# Patient Record
Sex: MALE | Race: OTHER | Employment: OTHER | URBAN - METROPOLITAN AREA
[De-identification: names, ages, dates, MRNs, and addresses within clinical notes are randomized per-mention and may not be internally consistent; named-entity substitution may affect disease eponyms.]

---

## 2017-01-01 ENCOUNTER — HOME CARE VISIT (OUTPATIENT)
Dept: HOSPICE | Facility: HOSPICE | Age: 82
End: 2017-01-01
Payer: MEDICARE

## 2017-01-01 ENCOUNTER — HOME CARE VISIT (OUTPATIENT)
Dept: SCHEDULING | Facility: HOME HEALTH | Age: 82
End: 2017-01-01
Payer: MEDICARE

## 2017-01-01 ENCOUNTER — HOSPITAL ENCOUNTER (INPATIENT)
Age: 82
LOS: 1 days | End: 2017-01-04
Attending: INTERNAL MEDICINE | Admitting: INTERNAL MEDICINE

## 2017-01-01 VITALS
OXYGEN SATURATION: 90 % | RESPIRATION RATE: 20 BRPM | SYSTOLIC BLOOD PRESSURE: 50 MMHG | DIASTOLIC BLOOD PRESSURE: 42 MMHG | TEMPERATURE: 91.1 F

## 2017-01-01 PROCEDURE — 74011250637 HC RX REV CODE- 250/637: Performed by: INTERNAL MEDICINE

## 2017-01-01 PROCEDURE — 0655 HSPC INPATIENT RESPITE

## 2017-01-01 PROCEDURE — 0651 HSPC ROUTINE HOME CARE

## 2017-01-01 PROCEDURE — G0156 HHCP-SVS OF AIDE,EA 15 MIN: HCPCS

## 2017-01-01 RX ORDER — MORPHINE SULFATE 20 MG/ML
2.5 SOLUTION ORAL
Status: DISCONTINUED | OUTPATIENT
Start: 2017-01-01 | End: 2017-01-01 | Stop reason: HOSPADM

## 2017-01-01 RX ORDER — WARFARIN 2 MG/1
2 TABLET ORAL DAILY
Status: DISCONTINUED | OUTPATIENT
Start: 2017-01-01 | End: 2017-01-01 | Stop reason: HOSPADM

## 2017-01-01 RX ORDER — LORAZEPAM 2 MG/ML
0.5 CONCENTRATE ORAL
Status: DISCONTINUED | OUTPATIENT
Start: 2017-01-01 | End: 2017-01-01 | Stop reason: HOSPADM

## 2017-01-01 RX ORDER — HYDROCORTISONE 25 MG/G
CREAM TOPICAL
Status: DISCONTINUED | OUTPATIENT
Start: 2017-01-01 | End: 2017-01-01 | Stop reason: HOSPADM

## 2017-01-01 RX ORDER — ATENOLOL 50 MG/1
25 TABLET ORAL EVERY 12 HOURS
Status: DISCONTINUED | OUTPATIENT
Start: 2017-01-01 | End: 2017-01-01 | Stop reason: HOSPADM

## 2017-01-01 RX ORDER — HYDROCORTISONE 10 MG/ML
LOTION TOPICAL
Status: DISCONTINUED | OUTPATIENT
Start: 2017-01-01 | End: 2017-01-01

## 2017-01-01 RX ORDER — FUROSEMIDE 40 MG/1
40 TABLET ORAL 2 TIMES DAILY
Status: DISCONTINUED | OUTPATIENT
Start: 2017-01-01 | End: 2017-01-01 | Stop reason: HOSPADM

## 2017-01-01 RX ADMIN — ATENOLOL 25 MG: 50 TABLET ORAL at 21:00

## 2017-01-01 RX ADMIN — FUROSEMIDE 40 MG: 40 TABLET ORAL at 21:42

## 2017-01-01 RX ADMIN — LORAZEPAM 0.5 MG: 2 SOLUTION, CONCENTRATE ORAL at 23:53

## 2017-01-01 RX ADMIN — MORPHINE SULFATE 2.6 MG: 20 SOLUTION ORAL at 23:53

## 2017-01-03 NOTE — PROGRESS NOTES
8298: Assumed care of pt. Pt resting calmly in bed voicing no complaints at this time. O2 @ 2L via nasal cannula. VS stable. Legs swollen and elevated for comfort. Admission assesment completed by DORA Baldwin.  5775-4439: Pt resting calmly in bed. Will continue to monitor. 3492-0432: Continues to rest.Wife called and stated that she forgot pt's systane eye drops she will bring in tomorrow. Wife also stated that pt's genitals were swollen as well as a sore on his bottom. Per wife, benadryl and trazodone make pt agitated and restless. These medications have been placed in allergies. 1630: Pt up to bedside commode had large soft BM and voided dark renata urine also. When nursing stood pt up; an area, stage 2 was noted on sacrum no drainage noted. Scrotum and penis extremely swollen as well.       NAME OF PATIENT:  Enedina Mcelroy Whiteside    LEVEL OF CARE:  Respite    REASON FOR GIP: NA      *PATIENT REMAINS ELIGIBLE FOR GIP LEVEL OF CARE AS EVIDENCED BY: (MUST BE ADDRESSED OF PATIENT GIP)      REASON FOR RESPITE:  Caregiver breakdown    O2 SAFETY:  Concentrator positioning (6\" from furniture/drapes) and Oxygen sign on the door    FALL INTERVENTIONS PROVIDED:   Implemented/recommended use of fall risk identification flag to all team members, Implemented/recommended assistive devices and encouraged their use, Implemented/recommended environmental changes (remove hazards, lower bed, improve lighting, etc.) and Implemented/recommended increased supervision/assistance    INTERDISPLINARY COMMUNICATION/COLLABORATION:  Physician, MSW, Kris and RN, CNA    [facility] [address] [facility]:NA      Reason medication is being initiated:  ALLYSON    MD / Provider name consulted re: change in status / initiation of new medication:  NA    New Symptom(s):  NA    New Order(s):  NA    Name of the person notified of the changes:  NA    Name of person being taught:  NA    Instructions given:  NA    Side Effects taught:  NA    Response to teaching:  NA      COMFORTABLE DYING MEASURE:  Is Patient/family satisfied with symptom level?  yes    DISCHARGE PLAN:  Home with family followed by [address] [place] [address]

## 2017-01-03 NOTE — H&P
[address] [place] [address] Help to Those in Need  [phone]    Patient Name: Tim Carlson  YOB: 1934    Date of Provider Hospice Visit: 01/03/17    Level of Care:   [] General Inpatient (GIP)    [] Routine   [x] Respite    Location of Care:  [] [facility] [] [facility] [] [facility] [] [facility] [x] Hospice [facility]  [] Home [] Other:      Hospice terminal diagnosis:  Acute on chronic systolic congestive heart failure (HCC) (I50.23)     Other Hospice diagnoses:  Malignant neoplasm of upper lobe of right lung (HCC) (C34.11)  Chronic obstructive pulmonary disease, unspecified COPD type ([address] (J44.9)  Sarcoidosis of lung ([address] (D86.0)  Type 2 diabetes mellitus with stage 3 chronic kidney disease, unspecified long term insulin use status (HCC) (E11.22, N18.3)  Hypertensive kidney disease with chronic kidney disease stage III (I12.9, N18.3)  Chronic kidney disease, stage III (moderate) (N18.3)  Coronary artery disease without angina pectoris, unspecified vessel or lesion type, unspecified whether native or transplanted heart (I25.10)  Venous insufficiency (I87.2)  Atrial fibrillation, unspecified type ([address] (I48.91)  Encounter for hospice care (Z51.5)     Benefit Period 1  Start Date: 12/10/2016  End Date: 3/8/2017     HOSPICE NARRATIVE COMPOSED BY PHYSICIAN   Rationale for a prognosis of life expectancy of 6 months or less if the disease follows its normal course:     Tim Carlson is a 80y.o. year old who was admitted to [facility]. The patient's principle diagnosis of CHF has resulted in functional disability, SOB, fluid overload, edema. Functionally, the patient's Palliative Performance Scale has declined over a period of 2 months and is estimated at 30.  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 The patient/family chose comfort measures with the support of Hospice. HOSPICE DIAGNOSES   Active Symptoms:  1. Shortness of breath  2. Anxiety  3. Swelling legs bilateral  4. Mild confusion  5. Decline in function     PLAN   1. Admit to respite care for caregiver exhaustion  2. Continue home routine medications; reviewed  3. Add low dose Roxanol 2.6mg q4h prn pain/SOB  4. Add low dose lorazepam 0.5mg q4h prn anxiety/SOB  5.  and SW to support family needs  6. Disposition: home with hospice care once 5 days respite is completed    Prognosis estimated based on 01/03/17 clinical assessment is:   [] Few to Many Hours  [] Hours to Days   [] Few to Many Days   [] Days to Weeks   [x] Few to Many Weeks   [] Weeks to Months   [] Few to Many Months    Communicated plan of care with: Hospice Case Manager; Hospice IDT; Care Team     GOALS OF CARE     Resuscitation Status: DNR  Durable DNR: [x] Yes [] No    Advance Care Planning 12/5/2016   Patient's Healthcare Decision Maker is: Verbal statement (Legal Next of Kin remains as decision maker)   Confirm Advance Directive Yes, on file        HISTORY     History obtained from: chart, staff, pt    CHIEF COMPLAINT: I am allright  The patient is:   [x] Verbal  [] Nonverbal  [] Unresponsive    HPI/SUBJECTIVE:  Pt appears little short of breath and when asked he said he was [id number], just a little tired and that he gets that way sometimes; appears to be somewhat restless as well.        REVIEW OF SYSTEMS     The following systems were: [x] reviewed  [] unable to be reviewed    Positive ROS include:  Constitutional: fatigue, weakness  Ears/nose/mouth/throat  Respiratory:shortness of breath  Gastrointestinal  Musculoskeletal: swelling legs  Neurologic:   Psychiatric:anxiety  Endocrine:        FUNCTIONAL ASSESSMENT     Palliative Performance Scale (PPS): 30%     PSYCHOSOCIAL/SPIRITUAL ASSESSMENT Active Problems:    * No active hospital problems. *    Past Medical History   Diagnosis Date    Aneurysm ([address] 6/2008    Arthritis      jason hands    CAD (coronary artery disease)      silent inferior MI 1998    Cellulitis      ble    Chronic anticoagulation 10/22/2015    CKD (chronic kidney disease) stage 3, GFR 30-59 ml/min     COPD      sarcoidosis    DM type 2 (diabetes mellitus, type 2) (HCC)      NIDDM    Hearing loss in right ear     Heart failure ([address]     HTN (hypertension)     Hypercholesteremia     ICD (implantable cardioverter-defibrillator) in place     Ill-defined condition      seasonal allergies    Ischemic cardiomyopathy     Lung mass 2015    Malignant neoplasm of upper lobe of right lung ([address] 10/5/2015    Mitral regurgitation      severe    Sarcoidosis of lung ([address]      associated with hypercalcemia    Shingles 2008    VSD (ventricular septal defect)      VSD patch and resection of inferior wall aneuyrsm 2008    VT (ventricular tachycardia) ([address] 4/2010     ICD ([facility])      Past Surgical History   Procedure Laterality Date    Echo 2d adult  4/2012     dilated LV, basal and mid inferior AK, basal inferior aneuyrsmal segment, EF 35% severe MR, LAE    Babatunde echo color flow  6/12/2012     Flail posterior scallop, likely P2. Severe eccentric MR. LAE, dilated LV, inferior AK, EF 35%, basal VSD patch - intact. RV normal size, free wall HK.     Cardiac catheterization  6/12/2012     PA 53.29/39 wedge 32, RA 16, EDP 21, no AV gradient, LM normal, LAD normal, ramus normal, LCX OM1 prox 40%, %    Hx cataract removal      Hx tonsillectomy      Hx pacemaker placement      Pr cardiac surg procedure unlist  6/4/2008     Aneursym repair, 2 patches    Pr cardiac surg procedure unlist  8/28/2012     2 clips on mitral valve ; proc [facility]    Hx orthopaedic Right 1998     knee    Hx pacemaker  4/2010     AICD Left upper chest- Jesusita Maynard Safe card on file    Hx other surgical       hernia 2014      Social History   Substance Use Topics    Smoking status: Former Smoker     Packs/day: 2.00     Quit date: 6/5/1994    Smokeless tobacco: Never Used    Alcohol use No     Family History   Problem Relation Age of Onset    Hypertension Mother     Cancer Father      lung    Heart Surgery Brother      stent x 1      Allergies   Allergen Reactions    Lisinopril Rash    Benadryl [Diphenhydramine Hcl] Other (comments)     restlessness    Bumetanide Hives    Hydrochlorothiazide Rash    Trazodone Anxiety     restlessness      Current Facility-Administered Medications   Medication Dose Route Frequency    atenolol (TENORMIN) tablet 25 mg  25 mg Oral Q12H    furosemide (LASIX) tablet 40 mg  40 mg Oral BID    [START ON 1/4/2017] warfarin (COUMADIN) tablet 2 mg  2 mg Oral DAILY    hydrocortisone (HYTONE) 2.5 % cream   Topical BID PRN    morphine (ROXANOL) 100 mg/5 mL (20 mg/mL) concentrated solution 2.6 mg  2.6 mg Oral Q4H PRN    LORazepam (INTENSOL) 2 mg/mL oral concentrate 0.5 mg  0.5 mg Oral Q4H PRN        PHYSICAL EXAM     Wt Readings from Last 3 Encounters:   12/10/16 88.5 kg (195 lb 1.7 oz)   10/28/16 84.8 kg (187 lb)   07/27/16 76.7 kg (169 lb)       Visit Vitals    /80 (BP 1 Location: Right arm, BP Patient Position: At rest;Supine)    Pulse 70    Temp 98.2 °F (36.8 °C)    Resp 16    SpO2 90%         Supplemental O2  [] Yes  [] NO  Last bowel movement:     Currently this patient has:  [] Peripheral IV [] PICC  [] PORT [] ICD    [] Jenkins Catheter [] NG Tube   [] PEG Tube    [] Rectal Tube [] Drain  [] Other:     Constitutional: pale, thin, appears in distress, restless and anxious, little short of breath.   Eyes: pallor+  ENMT:dry oral mucosa  Cardiovascular: normal  Respiratory:  labored breathing, chest secretions +, diminished air entry  Gastrointestinal: soft, BS +, non tender,   Musculoskeletal: edema 3 + both legs  Skin: warm, dry, areas of bruising, skin tears  Neurologic:  no obvious deficits  Psychiatric:anxious affect  Other:       Pertinent Lab and or Imaging Tests:           Total time: 70mts  Counseling / coordination time:   > 50% counseling / coordination?:

## 2017-01-04 NOTE — PROGRESS NOTES
0700 Report received from Ligia Mcgee RN. Pt is Respite for care giver exhaustion. 0830 Attempted to arouse pt and is unresponsive. Color dusky and mottled, nail beds blue. V/s 50/42, Temp 91.1 Ax, Resp 20, unable to get a pulse. 0850 Pt found in bed with no apical heartbeat, or respirations. Pt pronounced at this time. 0900 Phone call to the pts nurse Shannon Cortes to notify of death and to see if there was a contact other than the wife. 8288 Phone call to the pts wife to notify of the pts death. She is tearful but grieving approprtiately. She states she was just getting ready to come to see him. She states her daughters are out of town. She will make phone calls to family and will be in. A neighbor will be with her she states. The wife states they have chosen [employer] of [address] has arrived to unit and notified. 1000 Wife has arrived with 2 friends. Maria Luz Almazan met her and was there for support. She is tearful but appropriate  1025 The family have gone. [employer] notified for pickup.

## 2017-01-04 NOTE — PROGRESS NOTES
Verbal shift change report given to Jenifer De Leon RN by Bard Deonte LPN. Report included the following information SBAR, Kardex, Intake/Output and MAR.     1910  Patient quietly watching TV on first rounds. O2 at 3L nasal cannula. Sacral wound checked with Bard Deonte LPN, and documented. Calmoseptine cream and bordered gauze placed. Patient's pelvis, thighs, penis and scrotum grossly edematous, incontinent with clean/dry brief in place, loosely fastened. Patient alert and oriented x 1-2.    2144  Patient repositioned with pillow under right hip. Brief clean and dry. Medicated with lasix and atenolol. 2350  Patient requested medicine to help him relax and reports pain. Medicated with lorazepam and roxanol PRN. Repositioned to right side. 0030  Patient sleeping, no s/s of pain nor dyspnea. Brief continues to be dry and intact. 0200  Patient sleeping, no s/s of pain nor distress. Repositioned to left side. 0500  Patient repositioned to right side. Still sleeping peacefully. Brief changed for small amount of dark, concentrated urine. New brief fastened loosely. Towel folded and used to elevate swollen scrotum. NAME OF PATIENT:  Heather Loyd Barataria    LEVEL OF CARE:  Respite    REASON FOR GIP:   Patient not GIP at this time.     REASON FOR RESPITE:  Caregiver breakdown    O2 SAFETY:  Concentrator positioning (6\" from furniture/drapes), No petroleum based products on face while oxygen in use and Oxygen sign on the door    FALL INTERVENTIONS PROVIDED:   Implemented/recommended use of non-skid footwear, Implemented/recommended use of fall risk identification flag to all team members, Implemented/recommended assistive devices and encouraged their use, Implemented/recommended resources for alarm system (personal alarm, bed alarm, call bell, etc.) , Implemented/recommended environmental changes (remove hazards, lower bed, improve lighting, etc.) and Implemented/recommended increased supervision/assistance    INTERDISPLINARY COMMUNICATION/COLLABORATION:  Physician, MSW, [place] and RN, CNA    NEW MEDICATION INITIATION DOCUMENTATION:  Dr. Sudhir Cartwright ordered Roxanol and Ativan from home without being asked to do so. Not sure if these are new medications for the patient, but instructed him as if they were. Reason medication is being initiated:  Dr. Sudhir Cartwright ordered them from home and patient requested them. MD / Provider name consulted re: change in status / initiation of new medication:  Dr. Carol Gilbert. New Symptom(s):  No new symptoms. New Order(s):  Roxanol sublingual and lorazepam sublingual    Name of the person notified of the changes:  Patient    Name of person being taught:  Patient    Instructions given:  Frequency, side effects. Side Effects taught:  Sleepiness    Response to teaching:  Understood? COMFORTABLE DYING MEASURE:  Continue to monitor for pain, dyspnea, agitation, and intervene accordingly. Is Patient/family satisfied with symptom level?  yes    DISCHARGE PLAN:  Patient to be discharged home to wife/family's care once respite stay is complete.

## 2017-01-04 NOTE — PROGRESS NOTES
1668 [facility] Death Visit    I was informed by the staff at the [facility] that this respite patient had passed this morning at approximately 8:50. His wife had been notified and I and Mildred Dang waited for her arrival.  The pt was not expected to pass during this respite stay so his family was surprised. When Mrs. Adams arrived, Mildred Dang escorted her to the room where I was waiting. We expressed our condolences, offered sympathy, acknowledged her loss, as well as acknowledged the support given her  and her by [facility]. His wife, Preethi Casper" noted they couple had been  61 years. Two neighbors accompanied her and were being very supportive of Shara by telling stories and remembrances regarding the pt, as well as offering spiritual support. Silas Cabrales was tearful and overwhelmed by the unexpected timing of  Her 's death. Silas Cabrales ask that I offer a prayer and a blessing for \"Whitey\", the name everybody call the pt. which I did. Silas Cabrales indicated that a daughter, Radha Faulkner(?) was driving from [place] and would be here about 4 or 5 o'clock. Silas Cabrales said she and Radha Faulkner would tell the couple's son (Neida's brother) Harrison Wong, who is disabled, together in person of the death oh his father after Radha Faulkner arrived. Mildred Dang indicated that the family will use [employer] [facility] [place] and the RN has called them. Silas Cabrales did not wish removal to wait on the arrival of Radha Faulkner. Both friends who accompanied Silas Cabrales had lost family while being served by [facility] within the last 12 months and were familiar with our services and support. They were a great help to Silas Cabrales. Mildred Dang and I agree that grief be moderate given the suddenness of the death,  the length of the marriage , the stress of a disabled child, and the distance of Radha Faulkner from her mother. Silas Cabrales and her friends left and I escorted them from the facility.   Approximately 10 minuets later, Geronimo alvarez [employer] arrived for a [employer]" visit. He was not aware of the death of the pt. I met with him and discussed the situation and he left to meet Shara at her home.     Eloy Wyatt, [address]  [phone]

## 2017-01-05 NOTE — DISCHARGE SUMMARY
Discharge Summary    [facility]  Good Help to Those in Need  [phone]      Date of Admission: 1/3/2017  Date of Discharge: 1/4/2017    Liya Judd is a 80y.o. year old who was admitted to [facility] at [facility] with a Hospice diagnosis of Congestive heart failure . The patient's care was focused on comfort and the patient passed away on 1/4/2017.

## 2017-01-06 NOTE — HOSPICE
16:15- Pt's spouse came to [place] [address] to  patient's belongings. She also dropped off the patient's sealed  symptom relief kit stating she did not feel comfortable destroying the meds on her own.  The following meds were destroyed in the Rx Destroyer at the [place] [address] by Jaclynn Hatchet, RN and Federico Hernandez RN:  Ducolax suppositories- 2  Tylenol suppositories- 6  Levsin tabs- 4  Haldol- 14ml  Morphine- 14ml  Lorazepam- 14ml